# Patient Record
Sex: MALE | Race: BLACK OR AFRICAN AMERICAN | NOT HISPANIC OR LATINO
[De-identification: names, ages, dates, MRNs, and addresses within clinical notes are randomized per-mention and may not be internally consistent; named-entity substitution may affect disease eponyms.]

---

## 2022-10-06 ENCOUNTER — APPOINTMENT (OUTPATIENT)
Dept: ORTHOPEDIC SURGERY | Facility: CLINIC | Age: 52
End: 2022-10-06

## 2022-10-06 ENCOUNTER — RX RENEWAL (OUTPATIENT)
Age: 52
End: 2022-10-06

## 2022-10-06 VITALS — BODY MASS INDEX: 33.86 KG/M2 | HEIGHT: 72 IN | WEIGHT: 250 LBS

## 2022-10-06 DIAGNOSIS — Z00.00 ENCOUNTER FOR GENERAL ADULT MEDICAL EXAMINATION W/OUT ABNORMAL FINDINGS: ICD-10-CM

## 2022-10-06 DIAGNOSIS — I10 ESSENTIAL (PRIMARY) HYPERTENSION: ICD-10-CM

## 2022-10-06 DIAGNOSIS — M48.061 SPINAL STENOSIS, LUMBAR REGION WITHOUT NEUROGENIC CLAUDICATION: ICD-10-CM

## 2022-10-06 PROCEDURE — 99072 ADDL SUPL MATRL&STAF TM PHE: CPT

## 2022-10-06 PROCEDURE — 99214 OFFICE O/P EST MOD 30 MIN: CPT

## 2022-10-06 PROCEDURE — 72170 X-RAY EXAM OF PELVIS: CPT

## 2022-10-06 PROCEDURE — 72100 X-RAY EXAM L-S SPINE 2/3 VWS: CPT

## 2022-10-06 PROCEDURE — 73610 X-RAY EXAM OF ANKLE: CPT | Mod: LT

## 2022-10-06 RX ORDER — MELOXICAM 15 MG/1
15 TABLET ORAL DAILY
Qty: 30 | Refills: 0 | Status: COMPLETED | COMMUNITY
Start: 2022-10-06 | End: 2022-11-05

## 2022-10-06 NOTE — DISCUSSION/SUMMARY
[de-identified] : General Dx Discussion\par The patient was advised of the diagnosis. The natural history of the pathology was explained in full to the patient in layman's terms. All questions were answered. The risks and benefits of surgical and non-surgical treatment alternatives were explained in full to the patient.\par \par will get a mri lt ankle to eval for Achilles injury \par mri L/S spine to eval for stenosis  \par \par DR Wright after mri L/S spine Dr Cochran/Dennis after ankle mri

## 2022-10-06 NOTE — PHYSICAL EXAM
[NL (30)] : extension 30 degrees [TWNoteComboBox7] : forward flexion 75 degrees [de-identified] : extension 20 degrees [Left] : left foot and ankle [Moderate] : moderate swelling over achilles tendon [2+] : posterior tibialis pulse: 2+ [] : patient ambulates without assistive device

## 2022-10-06 NOTE — HISTORY OF PRESENT ILLNESS
[Work related] : work related [Dull/Aching] : dull/aching [Throbbing] : throbbing [Tightness] : tightness [Frequent] : frequent [Household chores] : household chores [Leisure] : leisure [Work] : work [Sleep] : sleep [Meds] : meds [Full time] : Work status: full time [de-identified] : Back pain has been worsening for the past 8 months. Left ankle has been swelling on and off.  [] : no [FreeTextEntry1] : back and left ankle [FreeTextEntry3] : 8/29/20 [FreeTextEntry7] : sometimes into the thigh [de-identified] : lifting, twisting [de-identified] : 2020 [de-identified] : Dr Meade and Dr Wright [de-identified] : MRI [de-identified] : [id number]

## 2022-10-13 ENCOUNTER — FORM ENCOUNTER (OUTPATIENT)
Age: 52
End: 2022-10-13

## 2022-10-14 ENCOUNTER — APPOINTMENT (OUTPATIENT)
Dept: MRI IMAGING | Facility: CLINIC | Age: 52
End: 2022-10-14

## 2022-10-14 PROCEDURE — 73721 MRI JNT OF LWR EXTRE W/O DYE: CPT | Mod: LT

## 2022-10-14 PROCEDURE — 99072 ADDL SUPL MATRL&STAF TM PHE: CPT

## 2022-10-14 PROCEDURE — 72148 MRI LUMBAR SPINE W/O DYE: CPT

## 2022-11-11 ENCOUNTER — RX RENEWAL (OUTPATIENT)
Age: 52
End: 2022-11-11

## 2022-11-18 ENCOUNTER — APPOINTMENT (OUTPATIENT)
Dept: ORTHOPEDIC SURGERY | Facility: CLINIC | Age: 52
End: 2022-11-18

## 2022-11-18 ENCOUNTER — APPOINTMENT (OUTPATIENT)
Dept: PAIN MANAGEMENT | Facility: CLINIC | Age: 52
End: 2022-11-18

## 2022-12-09 ENCOUNTER — APPOINTMENT (OUTPATIENT)
Dept: PAIN MANAGEMENT | Facility: CLINIC | Age: 52
End: 2022-12-09

## 2022-12-09 VITALS — HEIGHT: 72 IN | BODY MASS INDEX: 33.86 KG/M2 | WEIGHT: 250 LBS

## 2022-12-09 PROCEDURE — 99072 ADDL SUPL MATRL&STAF TM PHE: CPT

## 2022-12-09 PROCEDURE — 99244 OFF/OP CNSLTJ NEW/EST MOD 40: CPT

## 2022-12-09 NOTE — PHYSICAL EXAM
[de-identified] : Constitutional; Appears well, no apparent distress\par Ability to communicate: Normal \par Respiratory: non-labored breathing\par Skin: No rash noted\par Head: Normocephalic, atraumatic\par Neck: no visible thyroid enlargement\par Eyes: Extraocular movements intact\par Neurologic: Alert and oriented x3\par Psychiatric: normal mood, affect and behavior \par \par  [Flexion] : flexion [Extension] : extension [] : light touch intact throughout both lower extremities
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
## 2022-12-09 NOTE — DISCUSSION/SUMMARY
[de-identified] : After discussing various treatment options with the patient including but not limited to oral medications, physical therapy, exercise modalities as well as interventional spinal injections, we have decided with the following plan:\par \par - Continue Home exercises, stretching, activity modification, physical therapy, and conservative care.\par - MRI report and/or images was reviewed and discussed with the patient.\par - Recommend L4-5 Lumbar Epidural Steroid Injection under fluoroscopic guidance with image.\par - The risks, benefits and alternatives of the proposed procedure were explained in detail with the patient. The risks outlined include but are not limited to infection, bleeding, post-dural puncture headache, nerve injury, a temporary increase in pain, failure to resolve symptoms, allergic reaction, symptom recurrence, and possible elevation of blood sugar in diabetics. All questions were answered to patient's apparent satisfaction and he/she verbalized an understanding.\par - Patient is presenting with acute/sub-acute radicular pain with impairment in ADLs and functionality.  The pain has not responded to conservative care including NSAID therapy and/or physical therapy.  There is no bleeding tendency, unstable medical condition, or systemic infection.\par - Follow up in 1-2 weeks post injection for re-evaluation.\par - Will provide prescription for Physical Therapy.\par - Not interested in medications at this time. \par \par

## 2022-12-16 ENCOUNTER — APPOINTMENT (OUTPATIENT)
Dept: ORTHOPEDIC SURGERY | Facility: CLINIC | Age: 52
End: 2022-12-16

## 2023-01-06 ENCOUNTER — APPOINTMENT (OUTPATIENT)
Dept: ORTHOPEDIC SURGERY | Facility: CLINIC | Age: 53
End: 2023-01-06
Payer: OTHER MISCELLANEOUS

## 2023-01-06 DIAGNOSIS — M67.88 OTHER SPECIFIED DISORDERS OF SYNOVIUM AND TENDON, OTHER SITE: ICD-10-CM

## 2023-01-06 DIAGNOSIS — M25.672 STIFFNESS OF LEFT ANKLE, NOT ELSEWHERE CLASSIFIED: ICD-10-CM

## 2023-01-06 PROCEDURE — 99214 OFFICE O/P EST MOD 30 MIN: CPT

## 2023-01-06 PROCEDURE — 99072 ADDL SUPL MATRL&STAF TM PHE: CPT
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
## 2023-01-06 NOTE — PHYSICAL EXAM
[Left] : left foot and ankle [4___] : eversion 4[unfilled]/5 [2+] : posterior tibialis pulse: 2+ [Normal] : saphenous nerve sensation normal [] : patient ambulates without assistive device [FreeTextEntry8] : Mild thickening in achilles watershed area. [de-identified] : plantar flexion 30 degrees [de-identified] : inversion 15 degrees [de-identified] : eversion 10 degrees [TWNoteComboBox7] : dorsiflexion 10 degrees
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
## 2023-01-06 NOTE — WORK
[Mild Partial] : mild partial [Does not reveal pre-existing condition(s) that may affect treatment/prognosis] : does not reveal pre-existing condition(s) that may affect treatment/prognosis [N/A] : : Not Applicable [No Rx restrictions] : No Rx restrictions. [I provided the services listed above] :  I provided the services listed above. [FreeTextEntry1] : guarded [FreeTextEntry2] : Patient is currently working full duty

## 2023-01-09 ENCOUNTER — FORM ENCOUNTER (OUTPATIENT)
Age: 53
End: 2023-01-09

## 2023-01-25 ENCOUNTER — FORM ENCOUNTER (OUTPATIENT)
Age: 53
End: 2023-01-25

## 2023-02-17 ENCOUNTER — APPOINTMENT (OUTPATIENT)
Dept: ORTHOPEDIC SURGERY | Facility: CLINIC | Age: 53
End: 2023-02-17

## 2023-02-21 LAB — SARS-COV-2 N GENE NPH QL NAA+PROBE: NOT DETECTED

## 2023-02-22 ENCOUNTER — APPOINTMENT (OUTPATIENT)
Age: 53
End: 2023-02-22
Payer: OTHER MISCELLANEOUS

## 2023-02-22 PROCEDURE — 62323 NJX INTERLAMINAR LMBR/SAC: CPT

## 2023-03-10 ENCOUNTER — APPOINTMENT (OUTPATIENT)
Dept: PAIN MANAGEMENT | Facility: CLINIC | Age: 53
End: 2023-03-10
Payer: OTHER MISCELLANEOUS

## 2023-03-10 VITALS — BODY MASS INDEX: 33.86 KG/M2 | HEIGHT: 72 IN | WEIGHT: 250 LBS

## 2023-03-10 PROCEDURE — 99214 OFFICE O/P EST MOD 30 MIN: CPT

## 2023-03-10 PROCEDURE — 99072 ADDL SUPL MATRL&STAF TM PHE: CPT

## 2023-03-10 NOTE — PHYSICAL EXAM
[Flexion] : flexion [Extension] : extension [de-identified] : Constitutional; Appears well, no apparent distress\par Ability to communicate: Normal \par Respiratory: non-labored breathing\par Skin: No rash noted\par Head: Normocephalic, atraumatic\par Neck: no visible thyroid enlargement\par Eyes: Extraocular movements intact\par Neurologic: Alert and oriented x3\par Psychiatric: normal mood, affect and behavior \par \par  [] : no erythema
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
## 2023-03-10 NOTE — DISCUSSION/SUMMARY
[de-identified] : After discussing various treatment options with the patient including but not limited to oral medications, physical therapy, exercise modalities as well as interventional spinal injections, we have decided with the following plan:\par \par - Continue Home exercises, stretching, activity modification, physical therapy, and conservative care.\par - MRI report and/or images was reviewed and discussed with the patient.\par - Recommend First Diagnostic Bilateral L3,L4,L5 Medial Branch Blocks under fluoroscopic guidance with image.\par - Patient presents with axial lumbar pain that has not responded to 3 months of conservative therapy including physical therapy or NSAID therapy.  The pain is interfering with activities of daily living and functionality. There is no radicular pain. The pain is exacerbated by facet loading / positive Kemps maneuver which is defined by pain reproduction with extension and rotation of the lumbar spine to the affected side.  The patient has not had a vertebral fusion at the levels of the proposed treatment.  There is no unexplained neurologic deficit.  There is no history of systemic infection, unstable medical condition, bleeding tendency, or local infection.  The injection is being performed to diagnose the facet joint as the source of the individual's pain, in preparation for a radiofrequency ablation. \par - The risks, benefits, contents and alternatives to injection were explained in full to the patient.  Risks outlined include but are not limited to infection, sepsis, bleeding, post-dural puncture headache, nerve damage, temporary increase in pain, syncopal episode, failure to resolve symptoms, allergic reaction, symptom recurrence, and elevation of blood sugar in diabetics. Cortisone may cause immunosuppression.  Patient understands the risks.  All questions were answered.  After discussion of options, patient requested an injection.  Information regarding the injection was given to the patient.  Which medications to stop prior to the injection was explained to the patient as well.\par - Follow up in 1-2 weeks post injection for re-evaluation.\par \par

## 2023-03-22 ENCOUNTER — FORM ENCOUNTER (OUTPATIENT)
Age: 53
End: 2023-03-22

## 2023-09-18 ENCOUNTER — APPOINTMENT (OUTPATIENT)
Dept: ORTHOPEDIC SURGERY | Facility: CLINIC | Age: 53
End: 2023-09-18

## 2023-09-18 VITALS — BODY MASS INDEX: 33.86 KG/M2 | WEIGHT: 250 LBS | HEIGHT: 72 IN

## 2023-09-19 ENCOUNTER — APPOINTMENT (OUTPATIENT)
Dept: PAIN MANAGEMENT | Facility: CLINIC | Age: 53
End: 2023-09-19
Payer: OTHER MISCELLANEOUS

## 2023-09-19 VITALS — WEIGHT: 250 LBS | BODY MASS INDEX: 33.86 KG/M2 | HEIGHT: 72 IN

## 2023-09-19 PROCEDURE — 99213 OFFICE O/P EST LOW 20 MIN: CPT

## 2023-10-18 ENCOUNTER — APPOINTMENT (OUTPATIENT)
Age: 53
End: 2023-10-18
Payer: OTHER MISCELLANEOUS

## 2023-10-18 PROCEDURE — 64493 INJ PARAVERT F JNT L/S 1 LEV: CPT | Mod: 59,RT

## 2023-10-18 PROCEDURE — 64494 INJ PARAVERT F JNT L/S 2 LEV: CPT | Mod: 59,LT

## 2023-10-27 ENCOUNTER — APPOINTMENT (OUTPATIENT)
Dept: PAIN MANAGEMENT | Facility: CLINIC | Age: 53
End: 2023-10-27
Payer: OTHER MISCELLANEOUS

## 2023-10-27 VITALS — WEIGHT: 250 LBS | HEIGHT: 72 IN | BODY MASS INDEX: 33.86 KG/M2

## 2023-10-27 PROCEDURE — 99214 OFFICE O/P EST MOD 30 MIN: CPT

## 2023-11-03 ENCOUNTER — APPOINTMENT (OUTPATIENT)
Dept: ORTHOPEDIC SURGERY | Facility: CLINIC | Age: 53
End: 2023-11-03
Payer: OTHER MISCELLANEOUS

## 2023-11-03 VITALS — HEIGHT: 72 IN | WEIGHT: 250 LBS | BODY MASS INDEX: 33.86 KG/M2

## 2023-11-03 DIAGNOSIS — Z78.9 OTHER SPECIFIED HEALTH STATUS: ICD-10-CM

## 2023-11-03 PROCEDURE — 99214 OFFICE O/P EST MOD 30 MIN: CPT

## 2023-11-03 PROCEDURE — 73564 X-RAY EXAM KNEE 4 OR MORE: CPT | Mod: LT

## 2023-12-04 ENCOUNTER — APPOINTMENT (OUTPATIENT)
Dept: ORTHOPEDIC SURGERY | Facility: CLINIC | Age: 53
End: 2023-12-04
Payer: OTHER MISCELLANEOUS

## 2023-12-04 VITALS — BODY MASS INDEX: 33.59 KG/M2 | HEIGHT: 72 IN | WEIGHT: 248 LBS

## 2023-12-04 PROCEDURE — 20611 DRAIN/INJ JOINT/BURSA W/US: CPT | Mod: LT

## 2023-12-04 PROCEDURE — 99213 OFFICE O/P EST LOW 20 MIN: CPT | Mod: 25

## 2024-01-16 ENCOUNTER — APPOINTMENT (OUTPATIENT)
Dept: PAIN MANAGEMENT | Facility: CLINIC | Age: 54
End: 2024-01-16
Payer: OTHER MISCELLANEOUS

## 2024-01-16 VITALS — BODY MASS INDEX: 33.86 KG/M2 | WEIGHT: 250 LBS | HEIGHT: 72 IN

## 2024-01-16 PROCEDURE — 99214 OFFICE O/P EST MOD 30 MIN: CPT
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
## 2024-01-16 NOTE — PHYSICAL EXAM
[Flexion] : flexion [Extension] : extension [de-identified] : Constitutional; Appears well, no apparent distress\par  Ability to communicate: Normal \par  Respiratory: non-labored breathing\par  Skin: No rash noted\par  Head: Normocephalic, atraumatic\par  Neck: no visible thyroid enlargement\par  Eyes: Extraocular movements intact\par  Neurologic: Alert and oriented x3\par  Psychiatric: normal mood, affect and behavior \par  \par   [] : no erythema

## 2024-01-16 NOTE — DISCUSSION/SUMMARY
[de-identified] : After discussing various treatment options with the patient including but not limited to oral medications, physical therapy, exercise modalities as well as interventional spinal injections, we have decided with the following plan:  - Continue Home exercises, stretching, activity modification, physical therapy, and conservative care. - MRI report and/or images was reviewed and discussed with the patient. - Recommend L4-5 Lumbar Epidural Steroid Injection under fluoroscopic guidance with image. - The risks, benefits and alternatives of the proposed procedure were explained in detail with the patient. The risks outlined include but are not limited to infection, bleeding, post-dural puncture headache, nerve injury, a temporary increase in pain, failure to resolve symptoms, allergic reaction, symptom recurrence, and possible elevation of blood sugar in diabetics. All questions were answered to patient's apparent satisfaction and he/she verbalized an understanding. - Patient is presenting with acute/sub-acute radicular pain with impairment in ADLs and functionality.  The pain has not responded to conservative care including NSAID therapy and/or physical therapy.  There is no bleeding tendency, unstable medical condition, or systemic infection. - Follow up in 1-2 weeks post injection for re-evaluation. - Will provide prescription for Physical Therapy.

## 2024-01-19 ENCOUNTER — APPOINTMENT (OUTPATIENT)
Dept: ORTHOPEDIC SURGERY | Facility: CLINIC | Age: 54
End: 2024-01-19
Payer: OTHER MISCELLANEOUS

## 2024-01-19 VITALS — WEIGHT: 250 LBS | BODY MASS INDEX: 33.86 KG/M2 | HEIGHT: 72 IN

## 2024-01-19 PROCEDURE — 99214 OFFICE O/P EST MOD 30 MIN: CPT

## 2024-01-19 NOTE — REASON FOR VISIT
[FreeTextEntry2] : New pain last 4 months ( part of the case) Left Knee W/C 8/29/2020 pain with running and jogging

## 2024-01-19 NOTE — PHYSICAL EXAM
[Left] : left knee [5___] : hamstring 5[unfilled]/5 [Negative] : negative Caren's [] : no calf tenderness [TWNoteComboBox7] : flexion 110 degrees [de-identified] : extension 3 degrees
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
## 2024-01-19 NOTE — DISCUSSION/SUMMARY
[de-identified] : Let this serve as a formal request for authorization for medial  brace. He is referred to see Dr San/Kamini to discuss TKA.  Return to me as needed. He is working.

## 2024-01-19 NOTE — WORK
[Partial] : partial [N/A] : : Not Applicable [Patient] : patient [No Rx restrictions] : No Rx restrictions. [I provided the services listed above] :  I provided the services listed above. [FreeTextEntry2] : working

## 2024-02-16 ENCOUNTER — APPOINTMENT (OUTPATIENT)
Dept: ORTHOPEDIC SURGERY | Facility: CLINIC | Age: 54
End: 2024-02-16
Payer: OTHER MISCELLANEOUS

## 2024-02-16 VITALS — WEIGHT: 250 LBS | HEIGHT: 72 IN | BODY MASS INDEX: 33.86 KG/M2

## 2024-02-16 PROCEDURE — 99214 OFFICE O/P EST MOD 30 MIN: CPT

## 2024-02-16 NOTE — IMAGING
[Left] : left knee [advanced tricompartmental OA with medial compartment narrowing and varus alignment] : advanced tricompartmental OA with medial compartment narrowing and varus alignment [de-identified] : RIGHT KNEE No Effusion Varus deformity  +Medial joint line tenderness +Lateral joint line tenderness +PF tenderness +Crepitus ROM 7-115 5/5 Strength NVI Non-antalgic gait w/o assistance

## 2024-02-16 NOTE — DISCUSSION/SUMMARY
[de-identified] : The patient was advised of the diagnosis.  The natural history of the pathology was explained in full to the patient in layman's terms. All questions were answered.  The risks and benefits of surgical and non-surgical treatment alternatives were explained in full to the patient.  The natural progression of Osteoarthritis was explained to the patient. We discussed the possible treatment options from conservative to operative. These included NSAIDS, Glucosamine and Chondroitin sulfate, and Physical Therapy as well different types of injections. We also discussed that at some point they may progress to needing a TKA.  Information and pamphlets were given when appropriate.  Progress note completed by Kassie Rivera PA-C.

## 2024-02-16 NOTE — ASSESSMENT
[FreeTextEntry1] : 2/16/24: Adv L knee OA with history of patella tendon rupture repair (2009) and lateral release/synovectomy (2010). Discussed anti-inflammatories, PT/HEP, CSI, gel inj, and TKA. Discussed TKA, r/b/a, and preop/postop periods in detail. He understands the increased complexity of his surgery due to hx of patella tendon repair. He is well informed and would like to consider his options at this point. Will request auth for L knee euflexxa series. f/up to begin inj
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
## 2024-02-28 ENCOUNTER — APPOINTMENT (OUTPATIENT)
Age: 54
End: 2024-02-28
Payer: OTHER MISCELLANEOUS

## 2024-02-28 PROCEDURE — 62323 NJX INTERLAMINAR LMBR/SAC: CPT

## 2024-03-12 ENCOUNTER — APPOINTMENT (OUTPATIENT)
Dept: PAIN MANAGEMENT | Facility: CLINIC | Age: 54
End: 2024-03-12
Payer: OTHER MISCELLANEOUS

## 2024-03-12 VITALS — WEIGHT: 250 LBS | BODY MASS INDEX: 33.86 KG/M2 | HEIGHT: 72 IN

## 2024-03-12 DIAGNOSIS — M47.816 SPONDYLOSIS W/OUT MYELOPATHY OR RADICULOPATHY, LUMBAR REGION: ICD-10-CM

## 2024-03-12 DIAGNOSIS — M54.50 LOW BACK PAIN, UNSPECIFIED: ICD-10-CM

## 2024-03-12 DIAGNOSIS — M79.10 MYALGIA, UNSPECIFIED SITE: ICD-10-CM

## 2024-03-12 DIAGNOSIS — M54.17 RADICULOPATHY, LUMBOSACRAL REGION: ICD-10-CM

## 2024-03-12 PROCEDURE — 99213 OFFICE O/P EST LOW 20 MIN: CPT
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
## 2024-03-12 NOTE — PHYSICAL EXAM
[de-identified] : Constitutional; Appears well, no apparent distress\par  Ability to communicate: Normal \par  Respiratory: non-labored breathing\par  Skin: No rash noted\par  Head: Normocephalic, atraumatic\par  Neck: no visible thyroid enlargement\par  Eyes: Extraocular movements intact\par  Neurologic: Alert and oriented x3\par  Psychiatric: normal mood, affect and behavior \par  \par   [] : no lumbar paraspinal tenderness [Extension] : extension

## 2024-03-12 NOTE — DISCUSSION/SUMMARY
[de-identified] : After discussing various treatment options with the patient including but not limited to oral medications, physical therapy, exercise modalities as well as interventional spinal injections, we have decided with the following plan:  - Continue home exercises, stretching, activity modification, physical therapy, and conservative care. - Follow-up as needed.

## 2024-04-05 ENCOUNTER — APPOINTMENT (OUTPATIENT)
Dept: ORTHOPEDIC SURGERY | Facility: CLINIC | Age: 54
End: 2024-04-05

## 2024-04-09 ENCOUNTER — APPOINTMENT (OUTPATIENT)
Dept: ORTHOPEDIC SURGERY | Facility: CLINIC | Age: 54
End: 2024-04-09

## 2024-04-16 ENCOUNTER — APPOINTMENT (OUTPATIENT)
Dept: ORTHOPEDIC SURGERY | Facility: CLINIC | Age: 54
End: 2024-04-16
Payer: OTHER MISCELLANEOUS

## 2024-04-16 VITALS — HEIGHT: 72 IN | WEIGHT: 250 LBS | BODY MASS INDEX: 33.86 KG/M2

## 2024-04-16 PROCEDURE — 20611 DRAIN/INJ JOINT/BURSA W/US: CPT | Mod: LT

## 2024-04-16 PROCEDURE — 99213 OFFICE O/P EST LOW 20 MIN: CPT | Mod: 25

## 2024-04-16 NOTE — IMAGING
[Left] : left knee [advanced tricompartmental OA with medial compartment narrowing and varus alignment] : advanced tricompartmental OA with medial compartment narrowing and varus alignment [de-identified] : RIGHT KNEE No Effusion Varus deformity  +Medial joint line tenderness +Lateral joint line tenderness +PF tenderness +Crepitus ROM 7-115 5/5 Strength NVI Non-antalgic gait w/o assistance

## 2024-04-16 NOTE — DISCUSSION/SUMMARY
[de-identified] : The patient was advised of the diagnosis.  The natural history of the pathology was explained in full to the patient in layman's terms. All questions were answered.  The risks and benefits of surgical and non-surgical treatment alternatives were explained in full to the patient.  The natural progression of Osteoarthritis was explained to the patient. We discussed the possible treatment options from conservative to operative. These included NSAIDS, Glucosamine and Chondroitin sulfate, and Physical Therapy as well different types of injections. We also discussed that at some point they may progress to needing a TKA.  Information and pamphlets were given when appropriate.  The risks, benefits, contents and alternatives to injection were explained in full to the patient.  Risks outlined include but are not limited to infection, sepsis, bleeding, scarring, skin discoloration, temporary increase in pain, syncopal episode, failure to resolve symptoms, allergic reaction, flare reaction, symptom recurrence. Patient understood the risks.  All questions were answered. Oral informed consent was obtained and sterile prep was done of the injection site.  Sterile technique was used to introduce the mixture.  Patient tolerated the procedure well.  Patient advised to ice the injection site this evening.  Signs and symptoms of infection reviewed and patient advised to call immediately for redness, fevers, and/or chills.  Progress note completed by Kassie Rivera PA-C.

## 2024-04-16 NOTE — PROCEDURE
[Large Joint Injection] : Large joint injection [Left] : of the left [Knee] : knee [Pain] : pain [Inflammation] : inflammation [X-ray evidence of Osteoarthritis on this or prior visit] : x-ray evidence of Osteoarthritis on this or prior visit [Alcohol] : alcohol [Ethyl Chloride sprayed topically] : ethyl chloride sprayed topically [Sterile technique used] : sterile technique used [___ cc    1%] : Lidocaine ~Vcc of 1%  [Euflexxa(20mg)] : 20mg of Euflexxa [#1] : series #1 [] : Patient tolerated procedure well [Call if redness, pain or fever occur] : call if redness, pain or fever occur [Apply ice for 15min out of every hour for the next 12-24 hours as tolerated] : apply ice for 15 minutes out of every hour for the next 12-24 hours as tolerated [Patient was advised to rest the joint(s) for ____ days] : patient was advised to rest the joint(s) for [unfilled] days [Previous OTC use and PT nontherapeutic] : patient has tried OTC's including aspirin, Ibuprofen, Aleve, etc or prescription NSAIDS, and/or exercises at home and/or physical therapy without satisfactory response [Patient had decreased mobility in the joint] : patient had decreased mobility in the joint [Risks, benefits, alternatives discussed / Verbal consent obtained] : the risks benefits, and alternatives have been discussed, and verbal consent was obtained [Prior failure or difficult injection] : prior failure or difficult injection [All ultrasound images have been permanently captured and stored accordingly in our picture archiving and communication system] : All ultrasound images have been permanently captured and stored accordingly in our picture archiving and communication system [Visualization of the needle and placement of injection was performed without complication] : visualization of the needle and placement of injection was performed without complication

## 2024-04-16 NOTE — ASSESSMENT
[FreeTextEntry1] : 2/16/24: Adv L knee OA with history of patella tendon rupture repair (2009) and lateral release/synovectomy (2010). Discussed anti-inflammatories, PT/HEP, CSI, gel inj, and TKA. Discussed TKA, r/b/a, and preop/postop periods in detail. He understands the increased complexity of his surgery due to hx of patella tendon repair. He is well informed and would like to consider his options at this point. Will request auth for L knee euflexxa series. f/up to begin inj   4/16/24: Euflexxa #1 L knee today, tolerated well
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
## 2024-04-24 PROBLEM — M17.12 ARTHRITIS OF KNEE, LEFT: Status: ACTIVE | Noted: 2024-04-24

## 2024-04-25 ENCOUNTER — APPOINTMENT (OUTPATIENT)
Dept: ORTHOPEDIC SURGERY | Facility: CLINIC | Age: 54
End: 2024-04-25
Payer: OTHER MISCELLANEOUS

## 2024-04-25 DIAGNOSIS — M17.12 UNILATERAL PRIMARY OSTEOARTHRITIS, LEFT KNEE: ICD-10-CM

## 2024-04-25 PROCEDURE — 20610 DRAIN/INJ JOINT/BURSA W/O US: CPT | Mod: LT

## 2024-04-25 PROCEDURE — 99204 OFFICE O/P NEW MOD 45 MIN: CPT | Mod: 25

## 2024-04-25 NOTE — IMAGING
[de-identified] :  LEFT KNEE Inspection:  mild effusion Palpation: medial joint line tenderness, anterior tenderness Knee Range of Motion:  3-125  Strength: 5/5 Quadriceps strength, 5/5 Hamstring strength Neurological: light touch is intact throughout Ligament Stability and Special Tests:  McMurrays: neg Lachman: neg Pivot Shift: neg Posterior Drawer: neg Valgus: neg Varus: neg Patella Apprehension: neg Patella Maltracking: neg

## 2024-04-25 NOTE — PROCEDURE
[FreeTextEntry3] : Injection Procedure Note:  The risks, benefits, and alternatives to viscosupplementation injection were reviewed with the patient. Risks outlined include but are not limited to infection, sepsis, bleeding, scarring, temporary increase in pain, syncopal episode, failure to resolve symptoms, symptoms recurrence, allergic reaction, flare reaction, pseudoseptic reaction.  Patient understood the risks and asked to proceed with this treatment course.  Patient Identification Name/: Verbal with patient and/or family  Procedure Verification: Procedure confirmed with patient or family/designee Consent for procedure: Verbal Consent Given Relevant documentation completed, reviewed, and signed Clinical indications for procedure confirmed  Time-out with all members of procedure team immediately prior to procedure: Correct patient identified. Agreement on procedure. Correct side and site.   KNEE INJECTION ([facility]) - LEFT After verbal consent and identification of the correct patient and correct site, the left knee were prepped using alcohol swabs and betadine. This was allowed time to air dry.  An injection of EUFLEXXA 2ml   was injected into the knee using a sterile 22G needle after ethyl chloride spray for skin anesthesia.  The patient tolerated the procedure well. After-care instructions were provided and included instructions to ice the area and to call if redness, pain, or fever develop.

## 2024-04-25 NOTE — HISTORY OF PRESENT ILLNESS
[de-identified] : 54 year old male chronic left knee pain worsening since 2024, has seen Dr. Suárez The pain is located  anterior and medial The pain is associated with  swelling and clicking Worse with activity and better at rest. Has tried injections. activity mod

## 2024-05-03 ENCOUNTER — APPOINTMENT (OUTPATIENT)
Dept: ORTHOPEDIC SURGERY | Facility: CLINIC | Age: 54
End: 2024-05-03
Payer: OTHER MISCELLANEOUS

## 2024-05-03 DIAGNOSIS — M17.12 UNILATERAL PRIMARY OSTEOARTHRITIS, LEFT KNEE: ICD-10-CM

## 2024-05-03 PROCEDURE — 99212 OFFICE O/P EST SF 10 MIN: CPT | Mod: 25

## 2024-05-03 PROCEDURE — 20611 DRAIN/INJ JOINT/BURSA W/US: CPT | Mod: LT

## 2024-05-07 VITALS — HEIGHT: 72 IN | WEIGHT: 250 LBS | BODY MASS INDEX: 33.86 KG/M2

## 2024-05-07 PROBLEM — M17.12 PRIMARY OSTEOARTHRITIS OF LEFT KNEE: Status: ACTIVE | Noted: 2023-12-04

## 2024-05-07 NOTE — PROCEDURE
[Large Joint Injection] : Large joint injection [Left] : of the left [Knee] : knee [Pain] : pain [Inflammation] : inflammation [X-ray evidence of Osteoarthritis on this or prior visit] : x-ray evidence of Osteoarthritis on this or prior visit [Alcohol] : alcohol [Ethyl Chloride sprayed topically] : ethyl chloride sprayed topically [Sterile technique used] : sterile technique used [___ cc    1%] : Lidocaine ~Vcc of 1%  [Euflexxa(20mg)] : 20mg of Euflexxa [] : Patient tolerated procedure well [Call if redness, pain or fever occur] : call if redness, pain or fever occur [Apply ice for 15min out of every hour for the next 12-24 hours as tolerated] : apply ice for 15 minutes out of every hour for the next 12-24 hours as tolerated [Patient was advised to rest the joint(s) for ____ days] : patient was advised to rest the joint(s) for [unfilled] days [Previous OTC use and PT nontherapeutic] : patient has tried OTC's including aspirin, Ibuprofen, Aleve, etc or prescription NSAIDS, and/or exercises at home and/or physical therapy without satisfactory response [Patient had decreased mobility in the joint] : patient had decreased mobility in the joint [Risks, benefits, alternatives discussed / Verbal consent obtained] : the risks benefits, and alternatives have been discussed, and verbal consent was obtained [Prior failure or difficult injection] : prior failure or difficult injection [All ultrasound images have been permanently captured and stored accordingly in our picture archiving and communication system] : All ultrasound images have been permanently captured and stored accordingly in our picture archiving and communication system [Visualization of the needle and placement of injection was performed without complication] : visualization of the needle and placement of injection was performed without complication [#3] : series #3

## 2024-05-09 NOTE — ASSESSMENT
[FreeTextEntry1] : 2/16/24: Adv L knee OA with history of patella tendon rupture repair (2009) and lateral release/synovectomy (2010). Discussed anti-inflammatories, PT/HEP, CSI, gel inj, and TKA. Discussed TKA, r/b/a, and preop/postop periods in detail. He understands the increased complexity of his surgery due to hx of patella tendon repair. He is well informed and would like to consider his options at this point. Will request auth for L knee euflexxa series. f/up to begin inj  4/16/24: Euflexxa #1 L knee today, tolerated well   5/3/24: (Had Euflexxa #2 with Dr. Seymour). Euflexxa #3 L knee done today, tolerated well. Follow up prn
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
## 2024-05-09 NOTE — IMAGING
[Left] : left knee [advanced tricompartmental OA with medial compartment narrowing and varus alignment] : advanced tricompartmental OA with medial compartment narrowing and varus alignment [de-identified] : RIGHT KNEE No Effusion Varus deformity  +Medial joint line tenderness +Lateral joint line tenderness +PF tenderness +Crepitus ROM 7-115 5/5 Strength NVI Non-antalgic gait w/o assistance

## 2024-05-09 NOTE — DISCUSSION/SUMMARY
[de-identified] : The natural progression of Osteoarthritis was explained to the patient.  We discussed the possible treatment options from conservative to operative.  These included NSAIDS, Glucosamine and Chondrotin sulfate, and Physical Therapy as well different types of injections.  We also discussed that at some point they may progress to needed a TKA.  Information and pamphlets were given when appropriate.  The risks, benefits, contents and alternatives to injection were explained in full to the patient.  Risks outlined include but are not limited to infection, sepsis, bleeding, scarring, skin discoloration, temporary increase in pain, syncopal episode, failure to resolve symptoms, allergic reaction, flare reaction, permanent white skin discoloration, symptom recurrence, and elevation of blood sugar in diabetics.  Patient understood the risks.  All questions were answered.  After discussion of options, patient requested an injection.  Oral informed consent was obtained and sterile prep was done of the injection site.  Sterile technique was used to introduce the mixture.  Patient tolerated the procedure well.  Patient advised to ice the injection site this evening.  Signs and symptoms of infection reviewed and patient advised to call immediately for redness, fevers, and/or chills.  Entered by Tamika Kong acting as a scribe.

## 2024-12-13 NOTE — ASSESSMENT
[FreeTextEntry1] : notes from Dr. Suárez reviewed Imaging was reviewed and independently interpreted xray left knee show deg changes  - We discussed their diagnosis and treatment options at length including the risks and benefits of both surgical and non-surgical options. Surgical risks include but are not limited to pain, infection, bleeding, vascular injury, numbness, tingling, nerve damage. - Due to risks of surgery, we will continue conservative treatment with PT, icing, and anti-inflammatory medications - The patient was provided with a PT prescription to work on ROM, hip ER/abductors strengthening, quad/hamstring stretches and strengthening, and other exercises - The patient was advised to let pain guide the gradual advancement of activities. - we spoke about possibility of viscosupplementation injections and they would like to proceed - L knee Euflexxa given, mynor well   Medication Discussion: 1) We discussed a comprehensive treatment plan that included possible pharmaceutical management involving the use of prescription strength medications including but not limited to options such as oral Naprosyn 500mg BID, once daily Meloxicam 15 mg, or 500-650 mg Tylenol versus over the counter oral medications in addition to discussing possible topical prescription Pennsaid vs  Voltaren gel. 2) There is a moderate risk of morbidity with further treatment, especially from use of prescription strength medications and possible side effects of these medications which include but are not limited to upset stomach with oral medications, skin reactions to topical medications and GI/cardiac/renal issues with long term use. 3) I recommended that the patient follow-up with their medical physician if there are any significant potential issues with long term medication use such as interactions with current medications or with exacerbation of underlying medical comorbidities. 4) The benefits and risks associated with use of oral and / or topical prescription and over the counter anti-inflammatory medications were discussed with the patient. The patient voiced understanding of the risks including but not limited to bleeding, stroke, kidney dysfunction, heart disease, and were referred to the black box warning label for further information. 80

## 2025-01-17 ENCOUNTER — APPOINTMENT (OUTPATIENT)
Dept: ORTHOPEDIC SURGERY | Facility: CLINIC | Age: 55
End: 2025-01-17
Payer: OTHER MISCELLANEOUS

## 2025-01-17 VITALS — WEIGHT: 250 LBS | BODY MASS INDEX: 33.86 KG/M2 | HEIGHT: 72 IN

## 2025-01-17 DIAGNOSIS — M17.12 UNILATERAL PRIMARY OSTEOARTHRITIS, LEFT KNEE: ICD-10-CM

## 2025-01-17 PROCEDURE — 73562 X-RAY EXAM OF KNEE 3: CPT | Mod: LT

## 2025-01-17 PROCEDURE — 99214 OFFICE O/P EST MOD 30 MIN: CPT

## 2025-01-17 RX ORDER — MELOXICAM 15 MG/1
15 TABLET ORAL DAILY
Qty: 30 | Refills: 1 | Status: ACTIVE | COMMUNITY
Start: 2025-01-17 | End: 1900-01-01

## 2025-02-07 ENCOUNTER — APPOINTMENT (OUTPATIENT)
Dept: ORTHOPEDIC SURGERY | Facility: CLINIC | Age: 55
End: 2025-02-07
Payer: OTHER MISCELLANEOUS

## 2025-02-07 DIAGNOSIS — M17.12 UNILATERAL PRIMARY OSTEOARTHRITIS, LEFT KNEE: ICD-10-CM

## 2025-02-07 PROCEDURE — 20611 DRAIN/INJ JOINT/BURSA W/US: CPT | Mod: LT

## 2025-02-18 ENCOUNTER — APPOINTMENT (OUTPATIENT)
Dept: ORTHOPEDIC SURGERY | Facility: CLINIC | Age: 55
End: 2025-02-18

## 2025-02-21 ENCOUNTER — APPOINTMENT (OUTPATIENT)
Dept: ORTHOPEDIC SURGERY | Facility: CLINIC | Age: 55
End: 2025-02-21
Payer: OTHER MISCELLANEOUS

## 2025-02-21 VITALS — WEIGHT: 250 LBS | HEIGHT: 72 IN | BODY MASS INDEX: 33.86 KG/M2

## 2025-02-21 DIAGNOSIS — M17.12 UNILATERAL PRIMARY OSTEOARTHRITIS, LEFT KNEE: ICD-10-CM

## 2025-02-21 PROCEDURE — 20611 DRAIN/INJ JOINT/BURSA W/US: CPT | Mod: LT

## 2025-03-07 ENCOUNTER — APPOINTMENT (OUTPATIENT)
Dept: ORTHOPEDIC SURGERY | Facility: CLINIC | Age: 55
End: 2025-03-07
Payer: OTHER MISCELLANEOUS

## 2025-03-07 VITALS — WEIGHT: 250 LBS | HEIGHT: 72 IN | BODY MASS INDEX: 33.86 KG/M2

## 2025-03-07 DIAGNOSIS — M17.12 UNILATERAL PRIMARY OSTEOARTHRITIS, LEFT KNEE: ICD-10-CM

## 2025-03-07 PROCEDURE — 20611 DRAIN/INJ JOINT/BURSA W/US: CPT | Mod: LT

## 2025-04-10 ENCOUNTER — RX RENEWAL (OUTPATIENT)
Age: 55
End: 2025-04-10

## 2025-04-18 ENCOUNTER — APPOINTMENT (OUTPATIENT)
Dept: ORTHOPEDIC SURGERY | Facility: CLINIC | Age: 55
End: 2025-04-18
Payer: OTHER MISCELLANEOUS

## 2025-04-18 ENCOUNTER — NON-APPOINTMENT (OUTPATIENT)
Age: 55
End: 2025-04-18

## 2025-04-18 VITALS — WEIGHT: 250 LBS | BODY MASS INDEX: 33.86 KG/M2 | HEIGHT: 72 IN

## 2025-04-18 DIAGNOSIS — M17.12 UNILATERAL PRIMARY OSTEOARTHRITIS, LEFT KNEE: ICD-10-CM

## 2025-04-18 PROCEDURE — 99213 OFFICE O/P EST LOW 20 MIN: CPT

## 2025-04-24 ENCOUNTER — NON-APPOINTMENT (OUTPATIENT)
Age: 55
End: 2025-04-24

## 2025-05-22 ENCOUNTER — RX RENEWAL (OUTPATIENT)
Age: 55
End: 2025-05-22

## 2025-07-22 ENCOUNTER — APPOINTMENT (OUTPATIENT)
Dept: ORTHOPEDIC SURGERY | Facility: CLINIC | Age: 55
End: 2025-07-22
Payer: OTHER MISCELLANEOUS

## 2025-07-22 ENCOUNTER — RX RENEWAL (OUTPATIENT)
Age: 55
End: 2025-07-22

## 2025-07-22 VITALS — WEIGHT: 250 LBS | BODY MASS INDEX: 33.86 KG/M2 | HEIGHT: 72 IN

## 2025-07-22 DIAGNOSIS — M17.12 UNILATERAL PRIMARY OSTEOARTHRITIS, LEFT KNEE: ICD-10-CM

## 2025-07-22 PROCEDURE — 99213 OFFICE O/P EST LOW 20 MIN: CPT

## 2025-09-19 ENCOUNTER — APPOINTMENT (OUTPATIENT)
Dept: ORTHOPEDIC SURGERY | Facility: CLINIC | Age: 55
End: 2025-09-19
Payer: OTHER MISCELLANEOUS

## 2025-09-19 VITALS — WEIGHT: 250 LBS | HEIGHT: 72 IN | BODY MASS INDEX: 33.86 KG/M2

## 2025-09-19 DIAGNOSIS — M17.12 UNILATERAL PRIMARY OSTEOARTHRITIS, LEFT KNEE: ICD-10-CM

## 2025-09-19 PROCEDURE — 99213 OFFICE O/P EST LOW 20 MIN: CPT
